# Patient Record
Sex: FEMALE | Race: ASIAN | NOT HISPANIC OR LATINO | URBAN - METROPOLITAN AREA
[De-identification: names, ages, dates, MRNs, and addresses within clinical notes are randomized per-mention and may not be internally consistent; named-entity substitution may affect disease eponyms.]

---

## 2017-01-01 ENCOUNTER — APPOINTMENT (OUTPATIENT)
Dept: GENERAL RADIOLOGY | Facility: CLINIC | Age: 27
DRG: 871 | End: 2017-01-01
Attending: DERMATOLOGY
Payer: MEDICAID

## 2017-01-01 ENCOUNTER — TRANSFERRED RECORDS (OUTPATIENT)
Dept: HEALTH INFORMATION MANAGEMENT | Facility: CLINIC | Age: 27
End: 2017-01-01

## 2017-01-01 ENCOUNTER — HOSPITAL ENCOUNTER (INPATIENT)
Facility: CLINIC | Age: 27
LOS: 2 days | DRG: 871 | End: 2017-06-30
Attending: INTERNAL MEDICINE | Admitting: SURGERY
Payer: MEDICAID

## 2017-01-01 ENCOUNTER — COMMUNICATION - HEALTHEAST (OUTPATIENT)
Dept: FAMILY MEDICINE | Facility: CLINIC | Age: 27
End: 2017-01-01

## 2017-01-01 ENCOUNTER — APPOINTMENT (OUTPATIENT)
Dept: GENERAL RADIOLOGY | Facility: CLINIC | Age: 27
DRG: 871 | End: 2017-01-01
Attending: PEDIATRICS
Payer: MEDICAID

## 2017-01-01 VITALS
DIASTOLIC BLOOD PRESSURE: 61 MMHG | WEIGHT: 91.27 LBS | SYSTOLIC BLOOD PRESSURE: 103 MMHG | OXYGEN SATURATION: 78 % | RESPIRATION RATE: 8 BRPM | TEMPERATURE: 97.7 F

## 2017-01-01 LAB
ABO + RH BLD: NORMAL
ABO + RH BLD: NORMAL
ALBUMIN UR-MCNC: >600 MG/DL
ALBUMIN UR-MCNC: ABNORMAL MG/DL
ANION GAP SERPL CALCULATED.3IONS-SCNC: 7 MMOL/L (ref 3–14)
ANION GAP SERPL CALCULATED.3IONS-SCNC: 8 MMOL/L (ref 3–14)
ANION GAP SERPL CALCULATED.3IONS-SCNC: 9 MMOL/L (ref 3–14)
APPEARANCE UR: ABNORMAL
APPEARANCE UR: ABNORMAL
BASOPHILS # BLD AUTO: 0.1 10E9/L (ref 0–0.2)
BASOPHILS NFR BLD AUTO: 0.1 %
BILIRUB UR QL STRIP: ABNORMAL
BILIRUB UR QL STRIP: NEGATIVE
BLD GP AB SCN SERPL QL: NORMAL
BLOOD BANK CMNT PATIENT-IMP: NORMAL
BUN SERPL-MCNC: 13 MG/DL (ref 7–30)
BUN SERPL-MCNC: 7 MG/DL (ref 7–30)
BUN SERPL-MCNC: 8 MG/DL (ref 7–30)
CALCIUM SERPL-MCNC: 7.3 MG/DL (ref 8.5–10.1)
CALCIUM SERPL-MCNC: 7.5 MG/DL (ref 8.5–10.1)
CALCIUM SERPL-MCNC: 7.7 MG/DL (ref 8.5–10.1)
CHLORIDE SERPL-SCNC: 108 MMOL/L (ref 94–109)
CHLORIDE SERPL-SCNC: 110 MMOL/L (ref 94–109)
CHLORIDE SERPL-SCNC: 111 MMOL/L (ref 94–109)
CK SERPL-CCNC: 98 U/L (ref 30–225)
CO2 SERPL-SCNC: 19 MMOL/L (ref 20–32)
CO2 SERPL-SCNC: 21 MMOL/L (ref 20–32)
CO2 SERPL-SCNC: 22 MMOL/L (ref 20–32)
COLOR UR AUTO: ABNORMAL
COLOR UR AUTO: ABNORMAL
CREAT SERPL-MCNC: 0.47 MG/DL (ref 0.52–1.04)
CREAT SERPL-MCNC: 0.78 MG/DL (ref 0.52–1.04)
CREAT SERPL-MCNC: 1.52 MG/DL (ref 0.52–1.04)
DIFFERENTIAL METHOD BLD: ABNORMAL
EOSINOPHIL # BLD AUTO: 0 10E9/L (ref 0–0.7)
EOSINOPHIL NFR BLD AUTO: 0.1 %
ERYTHROCYTE [DISTWIDTH] IN BLOOD BY AUTOMATED COUNT: 18.3 % (ref 10–15)
ERYTHROCYTE [DISTWIDTH] IN BLOOD BY AUTOMATED COUNT: 18.4 % (ref 10–15)
ERYTHROCYTE [DISTWIDTH] IN BLOOD BY AUTOMATED COUNT: NORMAL % (ref 10–15)
GFR SERPL CREATININE-BSD FRML MDRD: 41 ML/MIN/1.7M2
GFR SERPL CREATININE-BSD FRML MDRD: ABNORMAL ML/MIN/1.7M2
GFR SERPL CREATININE-BSD FRML MDRD: ABNORMAL ML/MIN/1.7M2
GLUCOSE BLDC GLUCOMTR-MCNC: 118 MG/DL (ref 70–99)
GLUCOSE BLDC GLUCOMTR-MCNC: 80 MG/DL (ref 70–99)
GLUCOSE SERPL-MCNC: 101 MG/DL (ref 70–99)
GLUCOSE SERPL-MCNC: 107 MG/DL (ref 70–99)
GLUCOSE SERPL-MCNC: 90 MG/DL (ref 70–99)
GLUCOSE UR STRIP-MCNC: 30 MG/DL
GLUCOSE UR STRIP-MCNC: ABNORMAL MG/DL
HCT VFR BLD AUTO: 24.6 % (ref 35–47)
HCT VFR BLD AUTO: 28.1 % (ref 35–47)
HCT VFR BLD AUTO: NORMAL % (ref 35–47)
HGB BLD-MCNC: 6.9 G/DL (ref 11.7–15.7)
HGB BLD-MCNC: 7.7 G/DL (ref 11.7–15.7)
HGB BLD-MCNC: 7.9 G/DL (ref 11.7–15.7)
HGB BLD-MCNC: NORMAL G/DL (ref 11.7–15.7)
HGB UR QL STRIP: ABNORMAL
HGB UR QL STRIP: ABNORMAL
IMM GRANULOCYTES # BLD: 0.5 10E9/L (ref 0–0.4)
IMM GRANULOCYTES NFR BLD: 0.9 %
KETONES UR STRIP-MCNC: 5 MG/DL
KETONES UR STRIP-MCNC: ABNORMAL MG/DL
LACTATE BLD-SCNC: 1.3 MMOL/L (ref 0.7–2.1)
LACTATE BLD-SCNC: 2.1 MMOL/L (ref 0.7–2.1)
LEUKOCYTE ESTERASE UR QL STRIP: ABNORMAL
LEUKOCYTE ESTERASE UR QL STRIP: ABNORMAL
LYMPHOCYTES # BLD AUTO: 2.8 10E9/L (ref 0.8–5.3)
LYMPHOCYTES NFR BLD AUTO: 5.7 %
MAGNESIUM SERPL-MCNC: 2.6 MG/DL (ref 1.6–2.3)
MAGNESIUM SERPL-MCNC: 2.8 MG/DL (ref 1.6–2.3)
MCH RBC QN AUTO: 21.5 PG (ref 26.5–33)
MCH RBC QN AUTO: 21.8 PG (ref 26.5–33)
MCH RBC QN AUTO: NORMAL PG (ref 26.5–33)
MCHC RBC AUTO-ENTMCNC: 28 G/DL (ref 31.5–36.5)
MCHC RBC AUTO-ENTMCNC: 28.1 G/DL (ref 31.5–36.5)
MCHC RBC AUTO-ENTMCNC: NORMAL G/DL (ref 31.5–36.5)
MCV RBC AUTO: 77 FL (ref 78–100)
MCV RBC AUTO: 78 FL (ref 78–100)
MCV RBC AUTO: NORMAL FL (ref 78–100)
MONOCYTES # BLD AUTO: 1.7 10E9/L (ref 0–1.3)
MONOCYTES NFR BLD AUTO: 3.5 %
NEUTROPHILS # BLD AUTO: 43.6 10E9/L (ref 1.6–8.3)
NEUTROPHILS NFR BLD AUTO: 89.7 %
NITRATE UR QL: ABNORMAL
NITRATE UR QL: NEGATIVE
NRBC # BLD AUTO: 0 10*3/UL
NRBC BLD AUTO-RTO: 0 /100
PH UR STRIP: 6 PH (ref 5–7)
PH UR STRIP: ABNORMAL PH (ref 5–7)
PHOSPHATE SERPL-MCNC: 3.8 MG/DL (ref 2.5–4.5)
PLATELET # BLD AUTO: 213 10E9/L (ref 150–450)
PLATELET # BLD AUTO: 500 10E9/L (ref 150–450)
PLATELET # BLD AUTO: NORMAL 10E9/L (ref 150–450)
POTASSIUM SERPL-SCNC: 3.2 MMOL/L (ref 3.4–5.3)
POTASSIUM SERPL-SCNC: 3.8 MMOL/L (ref 3.4–5.3)
POTASSIUM SERPL-SCNC: 3.8 MMOL/L (ref 3.4–5.3)
PROCALCITONIN SERPL-MCNC: 38.92 NG/ML
RBC # BLD AUTO: 3.21 10E12/L (ref 3.8–5.2)
RBC # BLD AUTO: 3.62 10E12/L (ref 3.8–5.2)
RBC # BLD AUTO: NORMAL 10E12/L (ref 3.8–5.2)
RBC #/AREA URNS AUTO: 27 /HPF (ref 0–2)
RBC #/AREA URNS AUTO: ABNORMAL /HPF (ref 0–2)
SODIUM SERPL-SCNC: 136 MMOL/L (ref 133–144)
SODIUM SERPL-SCNC: 138 MMOL/L (ref 133–144)
SODIUM SERPL-SCNC: 141 MMOL/L (ref 133–144)
SP GR UR STRIP: 1.03 (ref 1–1.03)
SP GR UR STRIP: ABNORMAL (ref 1–1.03)
SPECIMEN EXP DATE BLD: NORMAL
URN SPEC COLLECT METH UR: ABNORMAL
URN SPEC COLLECT METH UR: ABNORMAL
UROBILINOGEN UR STRIP-MCNC: ABNORMAL MG/DL (ref 0–2)
UROBILINOGEN UR STRIP-MCNC: NORMAL MG/DL (ref 0–2)
WBC # BLD AUTO: 27.8 10E9/L (ref 4–11)
WBC # BLD AUTO: 45.7 10E9/L (ref 4–11)
WBC # BLD AUTO: 48.6 10E9/L (ref 4–11)
WBC # BLD AUTO: NORMAL 10E9/L (ref 4–11)
WBC #/AREA URNS AUTO: 737 /HPF (ref 0–2)
WBC #/AREA URNS AUTO: ABNORMAL /HPF (ref 0–2)
WBC CLUMPS #/AREA URNS HPF: PRESENT /HPF

## 2017-01-01 PROCEDURE — 83735 ASSAY OF MAGNESIUM: CPT | Performed by: STUDENT IN AN ORGANIZED HEALTH CARE EDUCATION/TRAINING PROGRAM

## 2017-01-01 PROCEDURE — 25000132 ZZH RX MED GY IP 250 OP 250 PS 637: Performed by: DERMATOLOGY

## 2017-01-01 PROCEDURE — 93005 ELECTROCARDIOGRAM TRACING: CPT

## 2017-01-01 PROCEDURE — 80048 BASIC METABOLIC PNL TOTAL CA: CPT | Performed by: PEDIATRICS

## 2017-01-01 PROCEDURE — 40000275 ZZH STATISTIC RCP TIME EA 10 MIN

## 2017-01-01 PROCEDURE — 71010 XR CHEST PORT 1 VW: CPT

## 2017-01-01 PROCEDURE — 80048 BASIC METABOLIC PNL TOTAL CA: CPT | Performed by: DERMATOLOGY

## 2017-01-01 PROCEDURE — 85027 COMPLETE CBC AUTOMATED: CPT | Performed by: DERMATOLOGY

## 2017-01-01 PROCEDURE — 36415 COLL VENOUS BLD VENIPUNCTURE: CPT | Performed by: DERMATOLOGY

## 2017-01-01 PROCEDURE — 36415 COLL VENOUS BLD VENIPUNCTURE: CPT | Performed by: NEUROLOGICAL SURGERY

## 2017-01-01 PROCEDURE — 25000128 H RX IP 250 OP 636: Performed by: DERMATOLOGY

## 2017-01-01 PROCEDURE — 40000983 ZZH STATISTIC HFNC ADULT NON-CPAP

## 2017-01-01 PROCEDURE — 84145 PROCALCITONIN (PCT): CPT | Performed by: PEDIATRICS

## 2017-01-01 PROCEDURE — 25000128 H RX IP 250 OP 636: Performed by: STUDENT IN AN ORGANIZED HEALTH CARE EDUCATION/TRAINING PROGRAM

## 2017-01-01 PROCEDURE — 12000008 ZZH R&B INTERMEDIATE UMMC

## 2017-01-01 PROCEDURE — 99233 SBSQ HOSP IP/OBS HIGH 50: CPT | Mod: GC | Performed by: INTERNAL MEDICINE

## 2017-01-01 PROCEDURE — 86900 BLOOD TYPING SEROLOGIC ABO: CPT | Performed by: STUDENT IN AN ORGANIZED HEALTH CARE EDUCATION/TRAINING PROGRAM

## 2017-01-01 PROCEDURE — 83605 ASSAY OF LACTIC ACID: CPT | Performed by: NEUROLOGICAL SURGERY

## 2017-01-01 PROCEDURE — 85025 COMPLETE CBC W/AUTO DIFF WBC: CPT | Performed by: STUDENT IN AN ORGANIZED HEALTH CARE EDUCATION/TRAINING PROGRAM

## 2017-01-01 PROCEDURE — 20000004 ZZH R&B ICU UMMC

## 2017-01-01 PROCEDURE — 87040 BLOOD CULTURE FOR BACTERIA: CPT | Performed by: STUDENT IN AN ORGANIZED HEALTH CARE EDUCATION/TRAINING PROGRAM

## 2017-01-01 PROCEDURE — 85027 COMPLETE CBC AUTOMATED: CPT | Performed by: SURGERY

## 2017-01-01 PROCEDURE — 86850 RBC ANTIBODY SCREEN: CPT | Performed by: STUDENT IN AN ORGANIZED HEALTH CARE EDUCATION/TRAINING PROGRAM

## 2017-01-01 PROCEDURE — 25000128 H RX IP 250 OP 636: Performed by: INTERNAL MEDICINE

## 2017-01-01 PROCEDURE — 83735 ASSAY OF MAGNESIUM: CPT | Performed by: DERMATOLOGY

## 2017-01-01 PROCEDURE — 84100 ASSAY OF PHOSPHORUS: CPT | Performed by: DERMATOLOGY

## 2017-01-01 PROCEDURE — 80048 BASIC METABOLIC PNL TOTAL CA: CPT | Performed by: STUDENT IN AN ORGANIZED HEALTH CARE EDUCATION/TRAINING PROGRAM

## 2017-01-01 PROCEDURE — 93010 ELECTROCARDIOGRAM REPORT: CPT | Performed by: INTERNAL MEDICINE

## 2017-01-01 PROCEDURE — 25000128 H RX IP 250 OP 636

## 2017-01-01 PROCEDURE — 83605 ASSAY OF LACTIC ACID: CPT | Performed by: DERMATOLOGY

## 2017-01-01 PROCEDURE — 81001 URINALYSIS AUTO W/SCOPE: CPT | Performed by: INTERNAL MEDICINE

## 2017-01-01 PROCEDURE — 36416 COLLJ CAPILLARY BLOOD SPEC: CPT | Performed by: STUDENT IN AN ORGANIZED HEALTH CARE EDUCATION/TRAINING PROGRAM

## 2017-01-01 PROCEDURE — 85018 HEMOGLOBIN: CPT | Performed by: STUDENT IN AN ORGANIZED HEALTH CARE EDUCATION/TRAINING PROGRAM

## 2017-01-01 PROCEDURE — 36415 COLL VENOUS BLD VENIPUNCTURE: CPT | Performed by: SURGERY

## 2017-01-01 PROCEDURE — 25000128 H RX IP 250 OP 636: Performed by: PEDIATRICS

## 2017-01-01 PROCEDURE — 87086 URINE CULTURE/COLONY COUNT: CPT | Performed by: INTERNAL MEDICINE

## 2017-01-01 PROCEDURE — 86901 BLOOD TYPING SEROLOGIC RH(D): CPT | Performed by: STUDENT IN AN ORGANIZED HEALTH CARE EDUCATION/TRAINING PROGRAM

## 2017-01-01 PROCEDURE — 99222 1ST HOSP IP/OBS MODERATE 55: CPT | Mod: GC | Performed by: SURGERY

## 2017-01-01 PROCEDURE — 00000146 ZZHCL STATISTIC GLUCOSE BY METER IP

## 2017-01-01 PROCEDURE — 27210300 ZZH CANNULA HIGH FLOW, ADULT

## 2017-01-01 PROCEDURE — 36415 COLL VENOUS BLD VENIPUNCTURE: CPT | Performed by: STUDENT IN AN ORGANIZED HEALTH CARE EDUCATION/TRAINING PROGRAM

## 2017-01-01 PROCEDURE — 82550 ASSAY OF CK (CPK): CPT | Performed by: DERMATOLOGY

## 2017-01-01 PROCEDURE — 85048 AUTOMATED LEUKOCYTE COUNT: CPT | Performed by: PEDIATRICS

## 2017-01-01 PROCEDURE — 25000125 ZZHC RX 250: Performed by: STUDENT IN AN ORGANIZED HEALTH CARE EDUCATION/TRAINING PROGRAM

## 2017-01-01 RX ORDER — PIPERACILLIN SODIUM, TAZOBACTAM SODIUM 3; .375 G/15ML; G/15ML
3.38 INJECTION, POWDER, LYOPHILIZED, FOR SOLUTION INTRAVENOUS EVERY 6 HOURS
Status: DISCONTINUED | OUTPATIENT
Start: 2017-01-01 | End: 2017-01-01

## 2017-01-01 RX ORDER — SODIUM CHLORIDE 9 MG/ML
INJECTION, SOLUTION INTRAVENOUS CONTINUOUS
Status: ACTIVE | OUTPATIENT
Start: 2017-01-01 | End: 2017-01-01

## 2017-01-01 RX ORDER — DOPAMINE HYDROCHLORIDE 160 MG/100ML
2-20 INJECTION, SOLUTION INTRAVENOUS CONTINUOUS
Status: DISCONTINUED | OUTPATIENT
Start: 2017-01-01 | End: 2017-01-01 | Stop reason: HOSPADM

## 2017-01-01 RX ORDER — DOPAMINE HYDROCHLORIDE 160 MG/100ML
INJECTION, SOLUTION INTRAVENOUS
Status: COMPLETED
Start: 2017-01-01 | End: 2017-01-01

## 2017-01-01 RX ORDER — VANCOMYCIN HYDROCHLORIDE 1 G/200ML
1000 INJECTION, SOLUTION INTRAVENOUS ONCE
Status: COMPLETED | OUTPATIENT
Start: 2017-01-01 | End: 2017-01-01

## 2017-01-01 RX ORDER — ONDANSETRON 2 MG/ML
4 INJECTION INTRAMUSCULAR; INTRAVENOUS EVERY 6 HOURS PRN
Status: DISCONTINUED | OUTPATIENT
Start: 2017-01-01 | End: 2017-01-01 | Stop reason: HOSPADM

## 2017-01-01 RX ORDER — HEPARIN SODIUM,PORCINE 10 UNIT/ML
2-5 VIAL (ML) INTRAVENOUS
Status: DISCONTINUED | OUTPATIENT
Start: 2017-01-01 | End: 2017-01-01 | Stop reason: HOSPADM

## 2017-01-01 RX ORDER — LIDOCAINE 40 MG/G
CREAM TOPICAL
Status: DISCONTINUED | OUTPATIENT
Start: 2017-01-01 | End: 2017-01-01 | Stop reason: HOSPADM

## 2017-01-01 RX ORDER — POTASSIUM CHLORIDE 20MEQ/15ML
40 LIQUID (ML) ORAL DAILY
Status: DISCONTINUED | OUTPATIENT
Start: 2017-01-01 | End: 2017-01-01

## 2017-01-01 RX ORDER — SODIUM CHLORIDE 9 MG/ML
INJECTION, SOLUTION INTRAVENOUS
Status: COMPLETED
Start: 2017-01-01 | End: 2017-01-01

## 2017-01-01 RX ORDER — FERROUS SULFATE 7.5 MG/0.5
100 SYRINGE (EA) ORAL DAILY
Status: DISCONTINUED | OUTPATIENT
Start: 2017-01-01 | End: 2017-01-01

## 2017-01-01 RX ORDER — ONDANSETRON 4 MG/1
4 TABLET, ORALLY DISINTEGRATING ORAL EVERY 6 HOURS PRN
Status: DISCONTINUED | OUTPATIENT
Start: 2017-01-01 | End: 2017-01-01 | Stop reason: HOSPADM

## 2017-01-01 RX ORDER — POTASSIUM CHLORIDE 20MEQ/15ML
40 LIQUID (ML) ORAL ONCE
Status: COMPLETED | OUTPATIENT
Start: 2017-01-01 | End: 2017-01-01

## 2017-01-01 RX ORDER — PIPERACILLIN SODIUM, TAZOBACTAM SODIUM 2; .25 G/10ML; G/10ML
2.25 INJECTION, POWDER, LYOPHILIZED, FOR SOLUTION INTRAVENOUS EVERY 6 HOURS
Status: DISCONTINUED | OUTPATIENT
Start: 2017-01-01 | End: 2017-01-01 | Stop reason: HOSPADM

## 2017-01-01 RX ORDER — NALOXONE HYDROCHLORIDE 0.4 MG/ML
.1-.4 INJECTION, SOLUTION INTRAMUSCULAR; INTRAVENOUS; SUBCUTANEOUS
Status: DISCONTINUED | OUTPATIENT
Start: 2017-01-01 | End: 2017-01-01 | Stop reason: HOSPADM

## 2017-01-01 RX ORDER — SODIUM CHLORIDE, SODIUM LACTATE, POTASSIUM CHLORIDE, CALCIUM CHLORIDE 600; 310; 30; 20 MG/100ML; MG/100ML; MG/100ML; MG/100ML
INJECTION, SOLUTION INTRAVENOUS
Status: COMPLETED
Start: 2017-01-01 | End: 2017-01-01

## 2017-01-01 RX ORDER — ENOXAPARIN SODIUM 100 MG/ML
20 INJECTION SUBCUTANEOUS EVERY 24 HOURS
Status: DISCONTINUED | OUTPATIENT
Start: 2017-01-01 | End: 2017-01-01 | Stop reason: HOSPADM

## 2017-01-01 RX ORDER — SODIUM CHLORIDE 9 MG/ML
INJECTION, SOLUTION INTRAVENOUS CONTINUOUS
Status: DISCONTINUED | OUTPATIENT
Start: 2017-01-01 | End: 2017-01-01

## 2017-01-01 RX ORDER — AMOXICILLIN 250 MG
1-2 CAPSULE ORAL 2 TIMES DAILY PRN
Status: DISCONTINUED | OUTPATIENT
Start: 2017-01-01 | End: 2017-01-01 | Stop reason: HOSPADM

## 2017-01-01 RX ORDER — SODIUM CHLORIDE, SODIUM LACTATE, POTASSIUM CHLORIDE, CALCIUM CHLORIDE 600; 310; 30; 20 MG/100ML; MG/100ML; MG/100ML; MG/100ML
INJECTION, SOLUTION INTRAVENOUS
Status: DISCONTINUED
Start: 2017-01-01 | End: 2017-01-01 | Stop reason: HOSPADM

## 2017-01-01 RX ADMIN — NOREPINEPHRINE BITARTRATE 0.03 MCG/KG/MIN: 1 INJECTION INTRAVENOUS at 08:33

## 2017-01-01 RX ADMIN — MINERAL SUPPLEMENT IRON 300 MG / 5 ML STRENGTH LIQUID 100 PER BOX UNFLAVORED 300 MG: at 07:52

## 2017-01-01 RX ADMIN — VANCOMYCIN HYDROCHLORIDE 1000 MG: 1 INJECTION, SOLUTION INTRAVENOUS at 09:37

## 2017-01-01 RX ADMIN — PIPERACILLIN AND TAZOBACTAM 2.25 G: 2; .25 INJECTION, POWDER, LYOPHILIZED, FOR SOLUTION INTRAVENOUS; PARENTERAL at 13:37

## 2017-01-01 RX ADMIN — PIPERACILLIN AND TAZOBACTAM 2.25 G: 2; .25 INJECTION, POWDER, LYOPHILIZED, FOR SOLUTION INTRAVENOUS; PARENTERAL at 02:45

## 2017-01-01 RX ADMIN — MINERAL SUPPLEMENT IRON 300 MG / 5 ML STRENGTH LIQUID 100 PER BOX UNFLAVORED 300 MG: at 21:14

## 2017-01-01 RX ADMIN — SODIUM CHLORIDE: 9 INJECTION, SOLUTION INTRAVENOUS at 06:54

## 2017-01-01 RX ADMIN — SODIUM CHLORIDE: 9 INJECTION, SOLUTION INTRAVENOUS at 08:06

## 2017-01-01 RX ADMIN — SODIUM CHLORIDE 1000 ML: 9 INJECTION, SOLUTION INTRAVENOUS at 03:22

## 2017-01-01 RX ADMIN — PIPERACILLIN AND TAZOBACTAM 2.25 G: 2; .25 INJECTION, POWDER, LYOPHILIZED, FOR SOLUTION INTRAVENOUS; PARENTERAL at 08:09

## 2017-01-01 RX ADMIN — SODIUM CHLORIDE, POTASSIUM CHLORIDE, SODIUM LACTATE AND CALCIUM CHLORIDE 500 ML: 600; 310; 30; 20 INJECTION, SOLUTION INTRAVENOUS at 07:50

## 2017-01-01 RX ADMIN — PIPERACILLIN AND TAZOBACTAM 3.38 G: 3; .375 INJECTION, POWDER, LYOPHILIZED, FOR SOLUTION INTRAVENOUS; PARENTERAL at 07:48

## 2017-01-01 RX ADMIN — DOPAMINE HYDROCHLORIDE 5 MCG/KG/MIN: 160 INJECTION, SOLUTION INTRAVENOUS at 09:33

## 2017-01-01 RX ADMIN — PIPERACILLIN AND TAZOBACTAM 3.38 G: 3; .375 INJECTION, POWDER, LYOPHILIZED, FOR SOLUTION INTRAVENOUS; PARENTERAL at 01:55

## 2017-01-01 RX ADMIN — ENOXAPARIN SODIUM 20 MG: 300 INJECTION INTRAVENOUS; SUBCUTANEOUS at 02:42

## 2017-01-01 RX ADMIN — PIPERACILLIN AND TAZOBACTAM 3.38 G: 3; .375 INJECTION, POWDER, LYOPHILIZED, FOR SOLUTION INTRAVENOUS; PARENTERAL at 20:06

## 2017-01-01 RX ADMIN — ENOXAPARIN SODIUM 30 MG: 30 INJECTION SUBCUTANEOUS at 21:36

## 2017-01-01 RX ADMIN — PIPERACILLIN AND TAZOBACTAM 2.25 G: 2; .25 INJECTION, POWDER, LYOPHILIZED, FOR SOLUTION INTRAVENOUS; PARENTERAL at 20:26

## 2017-01-01 RX ADMIN — SODIUM CHLORIDE: 900 INJECTION, SOLUTION INTRAVENOUS at 08:13

## 2017-01-01 RX ADMIN — POTASSIUM CHLORIDE 40 MEQ: 40 SOLUTION ORAL at 20:30

## 2017-01-01 RX ADMIN — SODIUM CHLORIDE, POTASSIUM CHLORIDE, SODIUM LACTATE AND CALCIUM CHLORIDE 500 ML: 600; 310; 30; 20 INJECTION, SOLUTION INTRAVENOUS at 08:09

## 2017-01-01 ASSESSMENT — PAIN DESCRIPTION - DESCRIPTORS: DESCRIPTORS: HEADACHE

## 2017-06-28 PROBLEM — I95.9 HYPOTENSION: Status: ACTIVE | Noted: 2017-01-01

## 2017-06-28 NOTE — CONSULTS
[facility]    NEUROSURGERY CONSULTATION NOTE    This consultation was requested by Dr. Mcadams from the MICU service.    Reason for Consultation: Patient with a shunt in the setting of urosepsis.     HPI: Shelton Acosta is a 26 year old developmentally delayed, Hmong-speaking, Spina bifida patient with a right occipital  shunt who presents with a positive UTI.     PAST MEDICAL HISTORY:   Spina bifida  Developmental delay   shunt    PAST SURGICAL HISTORY:    shunt    FAMILY HISTORY: Noncontributory    SOCIAL HISTORY:   Social History   Substance Use Topics     Smoking status: Not on file     Smokeless tobacco: Not on file     Alcohol use Not on file       MEDICATIONS:  No current outpatient prescriptions on file.       Allergies:  No Known Allergies      ROS: 10 point ROS of systems including Constitutional, Eyes, Respiratory, Cardiovascular, Gastroenterology, Genitourinary, Integumentary, Muscularskeletal, Psychiatric were all negative except for pertinent positives noted in my HPI.      PE:  Blood pressure 107/65, temperature 98.3  F (36.8  C), temperature source Oral, resp. rate 26, weight 38.9 kg (85 lb 12.1 oz), SpO2 100 %.  CV: RRR, no murmurs, rubs, or gallops  PULM: CTA B, no wheezes, no Battles, no crepitance, no racoons?  ABD: soft, non-distended, BS+  NEUROLOGIC:  -- Awake; Alert; oriented x 1  -- Follows commands briskly  -- Pupils reactive.       ASSESSMENT: Ms. Acosta is a 26 year old female with spina bifida and a  shunt who presents with urosepsis.     RECOMMENDATIONS:  - No neurosurgical intervention indicated at this time   - No shunt tap necessary. If acutely worsens would consider.   - Unlikely to be meningitic.   - Please obtain previous CT scans from OSH as will need comparisons.     The patient was discussed with the neurosurgery chief resident, who agrees with the above outlined plan.    Contact the neurosurgery resident on call with questions.     Dial * * *[phone]: Enter [phone] when prompted.   Joseph Jamison MD, PhD  Neurosurgery PGY-2

## 2017-06-28 NOTE — PROGRESS NOTES
Pt with spina bifida, developmental delay,  shunt, with c/o Headache. Transferred to MICU from [facility] ED with urosepsis requiring levophed.     A: Pt arrived via [facility] tport at 1640 on 3L NC, , BP: 110/60, RR 18, O2: 100%. Laying in prone position (as per her norm due to large spina bifida bony prominence on back). NS at 75/hr, received 2L at OSH as well as vanco, zosyn, and unasyn. Mag (1.5) replaced. K (3.0) not yet replaced. Incontinent of urine and stool but with intact skin.     Family: Mom, Wilver (Hmong speaking only) and sister, Keisha (bilingual) at bedside with [email or web address] . Both helped with admission questions and are informed about pt's condition. Per sister, straight faviola's TID, likes to color and play on ipad. Dietary preferences: no dairy. No greasy foods, and low salt.  Wilver plans to return tomorrow evening.     P: F/U labs and continue to monitor BP closely.    Neurosurg to see to r/o meningitis.   Possible transfer to Medicine floor tomorrow if BPs remain stable.

## 2017-06-28 NOTE — H&P
[facility]    History and Physical  [facility] Intensive Care     Date of Admission:  6/28/2017  [facility] History and Physical    Shelton Acosta MRN# [id number]   Age: 26 year old YOB: 1990     Date of Admission:  6/28/2017      Primary care provider: No primary care provider on file.          Assessment and Plan:   Assessment:   This patient is a 26 year old  female with a significant past medical history of spina bifida s/p  shunt, who presents from OSH with a CC of HA found to have hypotension requiring norepi support. She was transferred here for further management of hypotension and eval of  shunt by neurosurgery. Now off pressors.       Plan:   Neuro   #Spina bifida: Per mom, patient is at baseline neurologically. She is interactive but per mom has trouble with communication; she is effectively quadriplegic and has no sensation or movement below her waist    #Pain: acetaminophen 650 Q4H PRN     CVD:   #Hypotension: in the setting of urosepsis, meningitis less likely   - Improved with fluids and pressors  - Pt now with MAPs in the 70s and off pressors     Pulm: No acute issues; patient satting well on RA     ID:  # Septic shock: source likely UTI, vs lower concern for meningitis. UA at OSH and elevated WBC concerning for UTI. OSH concerned for meningitis given  shunt and hx of HA, and she was empirically treated with vanc and ampicillin.   - Repeated UA/cx  - will f/u blood cxs from OSH  - Zosyn 3.325mg Q6H  - Neurosurgery consulted for eval of  shunt and we will follow their recommendations regarding empiric treatment for meningitis    #lactic acidosis: 3.7 at OSH; 1.3 here after fluids     Vanc 6/28  Amp 6/28  Zosyn 6/28- present    Renal: no acute issues; patient is straight cathed at home    Heme  - Microcytic anemia: Hgb at OSH was 8.1; no signs of active bleeding; patient takes Fe at home       LINES: Peripheral line    Prophylaxis:  DVT: Padua prediction score of 4 2/2 immobility and active infection: Lovenox 30mg Q24hr  GI: none   Family:  Updated today with Cambodian    Code Status: DNR/DNI                 Chief Complaint:   Headache     Indication for critical care admission:  Hypotension     History is obtained from the patient's mom.           History of Present Illness (Resident / Clinician):   This patient is a 26 year old  female with a significant past medical history of spina bifida s/p  shunt,who presents from OSH with hypotension requiring pressor support. For one week, the patient has experienced HA, diarrhea, drowsiness, and decreased appetite.She denied any other recent illnesses or hospitalizations.  Her mother brought her to [facility] ED where her BP was 80/58, temp was 99.3, , RR 16, and SpO2 94%. She received a 1L fluid bolus and small doses of Levophed for a goal MAP greater than 65. WBC was 42.6, Hgb 8.1, plts 750, and UA showed greater than 100 WBC, elevated leuk esterase, few bacteria, negative nitrites. UA showed blood but 0-2 RBCs. Lactate was 3.7 and procalcitonin was 30.6. Blood cxs were drawn and she was given Zosyn for UTI treatment.  Non-con CT head was not concerning for  shunt dysfunction or hemorrhage and patient was treated empirically with vanc and amp for meningitis per neurology. She was transferred to our ICU for further management of hypotension and  shunt.              Past Medical History:   Spina bifida s/p  shunt          Past Surgical History:             Social History:   Lives with mother and sister. Quadriplegic and incontinent at baseline.           Family History:          Immunizations:          Allergies:   NKMA  Lactose intolerant          Medications:   Oral Fe  Tylenol PRN for pain           Review of Systems:   The Review of Systems is negative other than noted in the HPI          Physical Exam:   Temp: 98.3  F (36.8  C) Temp  Min: 98.3  F (36.8  C)   Max: 98.3  F (36.8  C)  Resp: 26 Resp  Min: 16  Max: 28  SpO2: 100 % SpO2  Min: 89 %  Max: 100 %    No Data Recorded  Heart Rate: 129 Heart Rate  Min: 110  Max: 130  BP: 107/65 Systolic (24hrs), Av , Min:84 , Max:107   Diastolic (24hrs), Av, Min:46, Max:71    Gen: Pt interviewed with Cambodian  in the room; pt lying on stomach, NAD  Pulm: LCTA posteriorly, no crackles or wheezes   Cardiac: hard to appreciate heart sounds as patient is unable to lay on her back; tachycardic  Abd: soft, non tender   Neuro: tracks with eyes, alert   Extremities: atrophy; warm; non-edematous   Back: marked kyphosis with surgical scars, scoliosis   Skin: scars on knuckles and arms           Data:   All labs and information from [facility] reviewed. Please refer to H&P for pertinent labs.     Patient care seen and discussed with Dr. Rojas.     Jamee Badillo M.D.   PGY1 Internal Medicine   [phone]

## 2017-06-29 NOTE — PLAN OF CARE
Problem: Goal Outcome Summary  Goal: Goal Outcome Summary  Outcome: No Change  D: Pt admitted with urosepsis and c/o HA.   A/I: Patient remains on 1L NC supplemental O2 and sating high 90's. Tried to turn off O2 and patient's sats drops to the mid 80's. Will continue to wean as tolerated. Afebrile with BP MAP>65

## 2017-06-29 NOTE — PROGRESS NOTES
[facility] ICU Progress Note          Assessment and Plan:   Assessment:   This patient is a 26 year old  female with a significant past medical history of spina bifida s/p  shunt, who presents from OSH with a CC of HA found to have hypotension requiring norepi support. She was transferred here for further management of hypotension and eval of  shunt by neurosurgery. Now off pressors.       Plan:   Neuro   #Spina bifida: Per mom, patient is at baseline neurologically. She is interactive but per mom has trouble with communication; she is effectively quadriplegic and has no sensation or movement below her waist    #Pain: acetaminophen 650 Q4H PRN     CVD:   #Hypotension: in the setting of urosepsis, meningitis less likely   - Improved with fluids and pressors  - Pt now with MAPs in the 70s and off pressors     Pulm: Patient is not on oxygen at home. She is currently on NC O2 at 1L and desatted to lower 90's when we removed NC. Low suspicion for PNA (no cough, fever), most likely 2/2 body habitus.  - CXR    ID:  # Septic shock: source likely UTI, vs lower concern for meningitis. UA at OSH and elevated WBC concerning for UTI. OSH concerned for meningitis given  shunt and hx of HA, and she was empirically treated with vanc and ampicillin.   - Repeated UA/cx  - will f/u blood cxs from OSH  - Zosyn 3.325mg Q6H  - Neurosurgery consulted for eval of - thought empiric treatment for meningitis not necessary     #lactic acidosis: 3.7 at OSH; 1.3 here after fluids     Vanc 6/28  Amp 6/28  Zosyn 6/28- present    Renal: no acute issues; patient is straight cathed at home    Heme  - Microcytic anemia: Hgb at OSH was 8.1; no signs of active bleeding; patient takes Fe at home       LINES: Peripheral line   Prophylaxis:  DVT: Padua prediction score of 4 2/2 immobility and active infection: Lovenox 30mg Q24hr  GI: none   Family:  Updated today with Cambodian    Code Status: DNR/DNI               Interval  History:   No acute events overnight         Significant Problems:     Patient Active Problem List   Diagnosis     Hypotension      Gen: Pt interviewed with Cambodian  in the room; pt lying on stomach, NAD  Pulm: LCTA posteriorly, no crackles or wheezes   Cardiac: hard to appreciate heart sounds as patient is unable to lay on her back; tachycardic  Abd: soft, non tender   Neuro: tracks with eyes, alert   Extremities: atrophy; warm; non-edematous   Back: marked kyphosis with surgical scars, scoliosis   Skin: scars on knuckles and arms        Review of Systems:   The Review of Systems is negative other than noted in the HPI          Medications:     Current Facility-Administered Medications   Medication     piperacillin-tazobactam (ZOSYN) 2.25 g vial to attach to  ml bag     enoxaparin (LOVENOX) PEDS/NICU injection 20 mg     naloxone (NARCAN) injection 0.1-0.4 mg     senna-docusate (SENOKOT-S;PERICOLACE) 8.6-50 MG per tablet 1-2 tablet     ondansetron (ZOFRAN-ODT) ODT tab 4 mg    Or     ondansetron (ZOFRAN) injection 4 mg     acetaminophen (TYLENOL) solution 650 mg     ferrous sulfate 300 (60 FE) MG/5ML syrup 300 mg            Data:     Results for orders placed or performed during the hospital encounter of 06/28/17 (from the past 24 hour(s))   Neurosurgery IP Consult: Patient to be seen: Routine - within 24 hours; Call back #: [id number]; 25 yo with spina bifida, who is presenting with septic shock. Likely urosepsis. Has HA, but no other symptoms to suggest meningitis. Continue treatment for...    Narrative    Josehp Jamison MD     6/28/2017  7:55 PM  [facility]    NEUROSURGERY CONSULTATION NOTE    This consultation was requested by Dr. Mcadams from the MICU   service.    Reason for Consultation: Patient with a shunt in the setting of   urosepsis.     HPI: Shelton Acosta is a 26 year old developmentally delayed,   Hmong-speaking, Spina bifida patient with a  right occipital    shunt who presents with a positive UTI.     PAST MEDICAL HISTORY:   Spina bifida  Developmental delay   shunt    PAST SURGICAL HISTORY:    shunt    FAMILY HISTORY: Noncontributory    SOCIAL HISTORY:   Social History   Substance Use Topics     Smoking status: Not on file     Smokeless tobacco: Not on file     Alcohol use Not on file       MEDICATIONS:  No current outpatient prescriptions on file.       Allergies:  No Known Allergies      ROS: 10 point ROS of systems including Constitutional, Eyes,   Respiratory, Cardiovascular, Gastroenterology, Genitourinary,   Integumentary, Muscularskeletal, Psychiatric were all negative   except for pertinent positives noted in my HPI.      PE:  Blood pressure 107/65, temperature 98.3  F (36.8  C), temperature   source Oral, resp. rate 26, weight 38.9 kg (85 lb 12.1 oz), SpO2   100 %.  CV: RRR, no murmurs, rubs, or gallops  PULM: CTA B, no wheezes, no Battles, no crepitance, no racoons?  ABD: soft, non-distended, BS+  NEUROLOGIC:  -- Awake; Alert; oriented x 1  -- Follows commands briskly  -- Pupils reactive.       ASSESSMENT: Ms. Acosta is a 26 year old female with spina bifida   and a  shunt who presents with urosepsis.     RECOMMENDATIONS:  - No neurosurgical intervention indicated at this time   - No shunt tap necessary. If acutely worsens would consider.   - Unlikely to be meningitic.   - Please obtain previous CT scans from OSH as will need   comparisons.     The patient was discussed with the neurosurgery chief resident,   who agrees with the above outlined plan.    Contact the neurosurgery resident on call with questions.    Dial * * *[phone]: Enter [phone] when prompted.   Joseph Jamison MD, PhD  Neurosurgery PGY-2         Basic metabolic panel   Result Value Ref Range    Sodium 136 133 - 144 mmol/L    Potassium 3.2 (L) 3.4 - 5.3 mmol/L    Chloride 108 94 - 109 mmol/L    Carbon Dioxide 19 (L) 20 - 32 mmol/L    Anion Gap 9 3 - 14 mmol/L    Glucose 90 70  - 99 mg/dL    Urea Nitrogen 7 7 - 30 mg/dL    Creatinine 0.47 (L) 0.52 - 1.04 mg/dL    GFR Estimate >90  Non  GFR Calc   >60 mL/min/1.7m2    GFR Estimate If Black >90   GFR Calc   >60 mL/min/1.7m2    Calcium 7.3 (L) 8.5 - 10.1 mg/dL   CBC with platelets   Result Value Ref Range    WBC  4.0 - 11.0 10e9/L     Quantity not sufficient  NOTIFIED STU NAZARIO RN ON  6/28/17 AT 1937 BY MO      RBC Count  3.8 - 5.2 10e12/L     Quantity not sufficient  NOTIFIED STU NAZARIO RN ON  6/28/17 AT 1937 BY MO      Hemoglobin  11.7 - 15.7 g/dL     Quantity not sufficient  NOTIFIED STU NAZARIO RN ON  6/28/17 AT 1937 BY MO      Hematocrit  35.0 - 47.0 %     Quantity not sufficient  NOTIFIED STU NAZARIO RN ON  6/28/17 AT 1937 BY MO      MCV  78 - 100 fl     Quantity not sufficient  NOTIFIED STU NAZARIO RN ON  6/28/17 AT 1937 BY MO      MCH  26.5 - 33.0 pg     Quantity not sufficient  NOTIFIED STU NAZARIO RN ON  6/28/17 AT 1937 BY MO      MCHC  31.5 - 36.5 g/dL     Quantity not sufficient  NOTIFIED STU NAZARIO RN ON  6/28/17  BY MO      RDW  10.0 - 15.0 %     Quantity not sufficient  NOTIFIED STU NAZARIO RN ON  6/28/17 AT 1937 BY MO      Platelet Count  150 - 450 10e9/L     Quantity not sufficient  NOTIFIED STU NAZARIO RN ON  6/28/17 AT 1937 BY MO     Magnesium   Result Value Ref Range    Magnesium 2.8 (H) 1.6 - 2.3 mg/dL   Phosphorus   Result Value Ref Range    Phosphorus 3.8 2.5 - 4.5 mg/dL   CK total   Result Value Ref Range    CK Total 98 30 - 225 U/L   Lactic acid whole blood   Result Value Ref Range    Lactic Acid 1.3 0.7 - 2.1 mmol/L   CBC with platelets   Result Value Ref Range    WBC 45.7 (H) 4.0 - 11.0 10e9/L    RBC Count 3.62 (L) 3.8 - 5.2 10e12/L    Hemoglobin 7.9 (L) 11.7 - 15.7 g/dL    Hematocrit 28.1 (L) 35.0 - 47.0 %    MCV 78 78 - 100 fl    MCH 21.8 (L) 26.5 - 33.0 pg    MCHC 28.1 (L) 31.5 - 36.5 g/dL    RDW 18.4 (H) 10.0  - 15.0 %    Platelet Count 213 150 - 450 10e9/L   CBC with platelets differential   Result Value Ref Range    WBC 48.6 (H) 4.0 - 11.0 10e9/L    RBC Count 3.21 (L) 3.8 - 5.2 10e12/L    Hemoglobin 6.9 (LL) 11.7 - 15.7 g/dL    Hematocrit 24.6 (L) 35.0 - 47.0 %    MCV 77 (L) 78 - 100 fl    MCH 21.5 (L) 26.5 - 33.0 pg    MCHC 28.0 (L) 31.5 - 36.5 g/dL    RDW 18.3 (H) 10.0 - 15.0 %    Platelet Count 500 (H) 150 - 450 10e9/L    Diff Method Automated Method     % Neutrophils 89.7 %    % Lymphocytes 5.7 %    % Monocytes 3.5 %    % Eosinophils 0.1 %    % Basophils 0.1 %    % Immature Granulocytes 0.9 %    Nucleated RBCs 0 0 /100    Absolute Neutrophil 43.6 (H) 1.6 - 8.3 10e9/L    Absolute Lymphocytes 2.8 0.8 - 5.3 10e9/L    Absolute Monocytes 1.7 (H) 0.0 - 1.3 10e9/L    Absolute Eosinophils 0.0 0.0 - 0.7 10e9/L    Absolute Basophils 0.1 0.0 - 0.2 10e9/L    Abs Immature Granulocytes 0.5 (H) 0 - 0.4 10e9/L    Absolute Nucleated RBC 0.0    Basic metabolic panel   Result Value Ref Range    Sodium 138 133 - 144 mmol/L    Potassium 3.8 3.4 - 5.3 mmol/L    Chloride 110 (H) 94 - 109 mmol/L    Carbon Dioxide 21 20 - 32 mmol/L    Anion Gap 7 3 - 14 mmol/L    Glucose 101 (H) 70 - 99 mg/dL    Urea Nitrogen 8 7 - 30 mg/dL    Creatinine 0.78 0.52 - 1.04 mg/dL    GFR Estimate >90  Non  GFR Calc   >60 mL/min/1.7m2    GFR Estimate If Black >90   GFR Calc   >60 mL/min/1.7m2    Calcium 7.5 (L) 8.5 - 10.1 mg/dL   Magnesium   Result Value Ref Range    Magnesium 2.6 (H) 1.6 - 2.3 mg/dL   Hemoglobin   Result Value Ref Range    Hemoglobin 7.7 (L) 11.7 - 15.7 g/dL   Type and Screen (AM Draw)   Result Value Ref Range    ABO A     RH(D)  Pos     Antibody Screen Neg     Test Valid Only At       [facility]    Specimen Expires 07/02/2017    Glucose by meter   Result Value Ref Range    Glucose 80 70 - 99 mg/dL   XR Chest Port 1 View    Narrative    XR CHEST PORT 1 VW  6/29/2017  9:11 AM      HISTORY: evaluate for pneumonia    COMPARISON: None    FINDINGS: Single portable AP view of the chest prone.  Ventriculoperitoneal shunt in place. Severely limited examination.  Very low lung volumes. There is no definite focal airspace  consolidation. There are bilateral perihilar opacities, though this  may be related to the patient's body habitus. Skeletal deformity  consistent with patient's history of spina bifida.      Impression    IMPRESSION: Extremely low lung volumes in this patient with spina  bifida. Perihilar opacities are nonspecific and may be due to  atelectasis. Otherwise no definite focal consolidation on this  extremely limited examination..    I have personally reviewed the examination and initial interpretation  and I agree with the findings.    NINA LINO MD

## 2017-06-29 NOTE — PLAN OF CARE
Pharmacy Renal Dosing Adjustment    Patient currently receiving piperacillin/tazo 3.375g IV Q6h.  Patient's Scr has changed from 0.48-0.78 in past 24 hours, patient's weight also only 38.9kg.  The following medications listed below are more appropriately dosed at piperacillin/tazo 2.25g IV Q6h dose will be automatically changed per [facility] renal dosing policy.     Loree Gandhi, PharmD  [phone]

## 2017-06-29 NOTE — PROGRESS NOTES
Critical Care Services Progress Note:  I personally examined and evaluated the patient today. I formulated today s plan with the house staff team or resident(s) and agree with the findings and plan in the associated note (see separately attested resident note).   I have evaluated all laboratory values and imaging studies from the past 24 hours.  Summary of hospital course:  26F with pmh of spina bifida and scoliosis, s/p  shunt, presented to OSH with sepsis requiring small dose levophed, likely urinary source.  Overnight events/pertinent findings today:  Clinically improved.  Off pressors.  Bp/hr acceptable off pressors.  Satting well on RA to  nasal cannula.   Comfortable on exam.  Very pleasant.  Labs (personally reviewed):  Lytes stable/acceptable.  Mild creatinine bump .47 to 0.7 .  Lactate 1.3.  Wbc still high 48 (stable).  hgb stable 7.7.  pltls ok 500.  Urinalysis at osh c/w uti.  cx pending.  Blood cultures from osh pending.  Assessment/plan:  1.  Shock:  Resolved.  Now off pressors. Lactate ok.  2.  UTI:  cx pending. Treating with zosyn.  3.   KIMBERLY:  In setting of sepsis.  Monitor uop/cr.  4.  Headache:  Likely due to infection.  Shunt series and head CT ok. Appreciate NSG input.  Low suspicion for meningitis.  May leave ICU today.  Rest per resident note.   I spent a total of 35 minutes (excluding procedure time) personally providing care for this patient.       Joseph Tapia

## 2017-06-29 NOTE — PROGRESS NOTES
Transferred from . Settled patient at 1500. Bladder scanned for 53cc. No pain. BA on. Call light within reach.

## 2017-06-29 NOTE — PROGRESS NOTES
[facility], [place]  Internal Medicine ICU Transfer Note      Assessment & Plan   Shelton Acosta is a 26 year old female admitted on 6/28/2017. She has a history of spina bifida s/p  shunt, severe kyphosis and scoliosis, developmental delay, neurogenic bladder, and iron deficiency anemia who was admitted for septic shock 2/2 possible UTI. Patient was transferred to the MICU from [facility] ED, then transferred to the Medicine unit as patient was hemodynamically stable.    Septic Shock likely 2/2 UTI. Shock has resolved. Admitted with hypotension and tachycardia requiring pressors at OSH ED. Did not require pressors at [facility] MICU. WBC 48, Procal 30.6, lactate 1.3 (3.7). UA at OSH concerning for UTI. Blood cultures drawn at OSH and started on Zosyn for suspected UTI (s/p Vanc, Amp, Zosyn-present). Called [facility] 6/29 for update on blood and urine cultures; however, cultures are pending. CT Head without hydrocephalus. Neurosurgery did not feel  shunt tap was necessary and unlikely to be meningitic. Currently on 1L O2 and desatted to 90% when removed per MICU staff, likely 2/2 body habitus. Hemodynamically stable, afebrile.  - Continue Zosyn for suspected UTI  - Follow urine and blood cultures at [facility]    - Trend CBC, procal    Spina Bifida s/p  shunt. Per notes, pt has no sensation below the waist and always lays on her stomach. Neurosurgery consulted and did not feel shunt tap was necessary, but will consider if clinically worsens. Neurosurgery following.   - Oxygen available PRN  - Consider PT/OT consult in AM    Iron Deficiency Anemia, Alpha thalassemia trait. Hgb baseline unclear, all readings from HP are ~7-8. Followed by Dr. Benson at [facility] Heme/Onc. Low ferritin, low iron, low transferrin and high TIBC per last Heme/Onc note in 2015. Has required IV iron most recently 4/15/16.   - Continue PTA iron supplement.  - Trend CBC daily  - Transfuse for Hgb <7  - Recommend follow  up with Dr. Benson on d/c     KIMBERLY. Creatinine from 0.47 to 0.78 overnight. In setting of sepsis and likely UTI. No signs of obstruction (straight caths), no hypovolemia on exam.  - Trend BMP    Neurogenic Bladder. Straight caths at home.   - Bladder scan and straight cath if >300ml    Diet: Advance Diet as Tolerated: Regular Diet Adult; 2 gm NA Diet; Low Fat Diet; Other - please comment (No dairy or eggs)  Room Service  Fluids: PO fluids  DVT Prophylaxis: Enoxaparin (Lovenox) SQ  Code Status: DNR/DNI    Disposition Plan   Expected discharge: 2 - 3 days; recommended to prior living arrangement once patient is transitioned from IV abx and medically stable.     Entered: Ruben Cuenca 06/29/2017, 4:45 PM   Information in the above section will display in the discharge planner report.      The patient's care was discussed with the Attending Physician, Dr. Vyas, Bedside Nurse and Patient.    Ruben Cuenca  Internal Medicine Staff Hospitalist Service  [facility]  Pager: [phone]  Please see sticky note for cross cover information    Interval History   Patient is feeling well. She endorses a headache. She has no pain otherwise. States she straight caths at home. Does not have chest pain or shortness of breath.    Data reviewed today: I reviewed all medications, new labs and imaging results over the last 24 hours. I personally reviewed the chest x-ray image(s) showing low lung volumes without concerns for pneumonia..    Physical Exam   Vital Signs: Temp: 97.4  F (36.3  C) Temp src: Oral BP: 122/48   Heart Rate: 89 Resp: 18 SpO2: 93 % O2 Device: Nasal cannula Oxygen Delivery: 1 LPM  Weight: 85 lbs 12.14 oz  General Appearance: Lying on stomach. In NAD.   Respiratory: Lungs CTAB posteriorly. Effort normal on 1LPM NC.   Cardiovascular: Not assessed due to inability to lay on back.   GI: Not assessed due to inability to lay on back.   Back: Significant kyphosis and scoliosis.  Skin: Erythematous  lesions scattered throughout dorsal hands and forearms.   Other: Atrophy in BLE. Does not move BLE.     Data     Recent Labs  Lab 06/29/17  0457 06/29/17  0337 06/28/17 1959 06/28/17  1911   WBC  --  48.6* 45.7* Quantity not sufficientNOTIFIED STU NAZARIO RN ON  6/28/17 AT 1937 BY MO   HGB 7.7* 6.9* 7.9* Quantity not sufficientNOTIFIED STU NAZARIO RN ON  6/28/17 AT 1937 BY MO   MCV  --  77* 78 Quantity not sufficientNOTIFIED STU NAZARIO RN ON  6/28/17 AT 1937 BY MO   PLT  --  500* 213 Quantity not sufficientNOTIFIED STU NAZARIO RN ON  6/28/17 AT 1937 BY MO   NA  --  138  --  136   POTASSIUM  --  3.8  --  3.2*   CHLORIDE  --  110*  --  108   CO2  --  21  --  19*   BUN  --  8  --  7   CR  --  0.78  --  0.47*   ANIONGAP  --  7  --  9   CAM  --  7.5*  --  7.3*   GLC  --  101*  --  90     Recent Results (from the past 24 hour(s))   XR Chest Port 1 View    Narrative    XR CHEST PORT 1 VW  6/29/2017 9:11 AM      HISTORY: evaluate for pneumonia    COMPARISON: None    FINDINGS: Single portable AP view of the chest prone.  Ventriculoperitoneal shunt in place. Severely limited examination.  Very low lung volumes. There is no definite focal airspace  consolidation. There are bilateral perihilar opacities, though this  may be related to the patient's body habitus. Skeletal deformity  consistent with patient's history of spina bifida.      Impression    IMPRESSION: Extremely low lung volumes in this patient with spina  bifida. Perihilar opacities are nonspecific and may be due to  atelectasis. Otherwise no definite focal consolidation on this  extremely limited examination..    I have personally reviewed the examination and initial interpretation  and I agree with the findings.    NINA LINO MD

## 2017-06-30 NOTE — PLAN OF CARE
Problem: Goal Outcome Summary  Goal: Goal Outcome Summary  Pt desatting to 50% when not wearing 1-2L O2; all O2 probes changed and same result; HR up to 140's; BP 73/37; pt diaphoretic and pale; c/o H/A; pt still responding to questions; Rapid Response called;  came to bedside to assess: ordered IV bolus; transfer pt to ; EKG; U/S. Has not made any urine today per report and charting. Continue to monitor.

## 2017-06-30 NOTE — PHARMACY-VANCOMYCIN DOSING SERVICE
Pharmacy Vancomycin Initial Note  Date of Service 2017  Patient's  1990  Ms. Shelton Acosta is a 26 year old woman admitted on 17 transferred from [facility] with possible sepsis 2/2 pyelonephritis.  Urine culture from there is reported to be 50-100K gm negative rods.  She has a hx of spina bifida with a  shunt.  She has a history of UTI's with the most recent organisms Morganella and E. Coli.  Note that Procalcitonin here is 38.09.    Wt = 41.4 kg    Indication: Pyelonephritis    Current estimated CrCl = 43 ml/min based on her actual wt of 41.4 kg.  Calculated GFR is 41 ml/min. Note that her creat has tripled over the past 48 hours and she has very low UOP.    Creatinine for last 3 days  2017:  7:11 PM Creatinine 0.47 mg/dL  2017:  3:37 AM Creatinine 0.78 mg/dL  2017: 12:31 AM Creatinine 1.52 mg/dL    Recent Vancomycin Level(s) for last 3 days  No results found for requested labs within last 72 hours.      Vancomycin IV Administrations (past 72 hours)      No vancomycin orders with administrations in past 72 hours.                Nephrotoxins and other renal medications (Future)    Start     Dose/Rate Route Frequency Ordered Stop    17 0800  vancomycin (VANCOCIN) 1000 mg in dextrose 5% 200 mL PREMIX      1,000 mg Intravenous ONCE 17 0749      17 1400  piperacillin-tazobactam (ZOSYN) 2.25 g vial to attach to  ml bag      2.25 g  over 30 Minutes Intravenous EVERY 6 HOURS 17 0828            Contrast Orders - past 72 hours     None                Plan:  1.  Start vancomycin 1000 mg x 1 dose (24 mg/kg). Will re-evaluate dosing in the next 24 hours.  2.  Goal Trough Level: 15-20 mg/L   3.  Pharmacy will check trough levels as appropriate in 1-3 Days.    4. Serum creatinine levels will be ordered daily for the first week of therapy and at least twice weekly for subsequent weeks.    5. [place] method utilized to dose vancomycin therapy: Method 2    6. Follow-up on culture data from [facility].    Fransisco Henderson, PharmD. [facility] pager [phone].

## 2017-06-30 NOTE — PLAN OF CARE
Problem: Goal Outcome Summary  Goal: Goal Outcome Summary  Pt family notified by this RN; spoke with sister to inform the family of the move back to .

## 2017-06-30 NOTE — PROGRESS NOTES
SPIRITUAL HEALTH SERVICES  SPIRITUAL ASSESSMENT Progress Note  [facility] ([place]) 4C     Checked in with pt's family at bedside after pt's death. Mother requested prayer. Other family are on their way in.    PLAN: additional  support available if needed.    Td Hollins M.Div. (Bill), [facility]  Staff   Pager [phone]

## 2017-06-30 NOTE — DISCHARGE SUMMARY
[facility], [place]    Death Summary  Intensive Care    Date of Admission:  6/28/2017  Date of Death:         6/30/2017  Provider Completing Death Summary: Sujata Giles    Discharge Diagnoses   Active Problems:    Hypotension  Septic Shock  Urinary tract infection  Spina Bifida    History of Present Illness   Shelton Acosta is an 26 year old  female with a significant past medical history of spina bifida s/p  shunt, who presents from OSH with a CC of HA found to have hypotension requiring norepi support. She was transferred here for further management of hypotension and eval of  shunt by neurosurgery.    Hospital Course   Shelton Acosta was admitted on 6/28/2017.  The following problems were addressed during her hospitalization:    Pt was admitted with hypotension and a new HA from an OSH on 6/28. She was initially given pressors at the OSH, but did not initially require any upon arrival here. She was started on zosyn, cultures were obtained, but her UA showed significant signs of infection. On 6/29, Pt was alert and interactive, and was deemed stable to transfer out of the ICU. Around midnight of 6/30, Pt's BP decreased significantly again, she received several liters of fluid, and was transferred to step-down level of care, but ended up in the MICU as overflow. The morning of 6/30, Pt's BP continued to be low with MAPs in the 50s, she was started on norepi, and transferred to the MICU service, but her pressures continued to fall and her HR started becoming increasingly bradycardic, so she was started on dopamine as well. Pt's family was contacted regarding the Pt's grave prognosis. They did confirm the Pt remained DNR/DNI, and were on there way to the hospital. Upon arrival, Pt's HR consistently was dropping to the 40s and her MAPs were barely in the high 50's. With an interpretor at the bedside, Pt's mother and sister continued to voice their hope for Pt to recover and wanted to  proceed with PICC placement for ongoing pressors. The family was aware that despite all of our efforts, the Pt would most likely not survive, and there was a significant risk for a fatal arrhythmia, but they were hoping more family would be able to see her. Pt was in the process of being supinated, when she went bradycardic into the teens and then finally asystole with the Pt's mother and sister at bedside. Pt was pronounced at 10:02 AM. A  was available and Pt's extended family came shortly after the Pt was pronounced.    Cause of death: septic shock    Primary Care Physician   No primary care provider on file.    Consultations This Hospital Stay   NEUROSURGERY IP CONSULT  VASCULAR ACCESS ADULT IP CONSULT  VASCULAR ACCESS ADULT IP CONSULT  PHARMACY TO DOSE VANCO    Time Spent on this Encounter   I, Sujata Giles, personally saw the patient today and spent greater than 30 minutes discharging this patient.    Data   Most Recent 3 CBC's:  Recent Labs   Lab Test  06/30/17   0031  06/29/17   0457  06/29/17 0337  06/28/17 1959 06/28/17 1911   WBC  27.8*   --   48.6*  45.7*  Quantity not sufficient  NOTIFIED STU NAZARIO RN ON  6/28/17 AT 1937 BY MO     HGB   --   7.7*  6.9*  7.9*  Quantity not sufficient  NOTIFIED STU NAZARIO RN ON  6/28/17 AT 1937 BY MO     MCV   --    --   77*  78  Quantity not sufficient  NOTIFIED STU NAZARIO RN ON  6/28/17  BY MO     PLT   --    --   500*  213  Quantity not sufficient  NOTIFIED STU NAZARIO RN ON  6/28/17 AT 1937 BY MO        Most Recent 3 BMP's:  Recent Labs   Lab Test  06/30/17   0031  06/29/17   0337  06/28/17 1911   NA  141  138  136   POTASSIUM  3.8  3.8  3.2*   CHLORIDE  111*  110*  108   CO2  22  21  19*   BUN  13  8  7   CR  1.52*  0.78  0.47*   ANIONGAP  8  7  9   CAM  7.7*  7.5*  7.3*   GLC  107*  101*  90     Most Recent 2 LFT's:No lab results found.  Most Recent INR's and Anticoagulation Dosing  History:  Anticoagulation Dose History     There is no flowsheet data to display.        Most Recent 3 Troponin's:No lab results found.  Most Recent Cholesterol Panel:No lab results found.  Most Recent 6 Bacteria Isolates From Any Culture (See EPIC Reports for Culture Details):No lab results found.  Most Recent TSH, T4 and A1c Labs:No lab results found.     Pt seen and discussed with Dr. Tapia, who agrees with the assessment and plan.    Sujata Giles MD  PGY-2, Internal Medicine  [phone]

## 2017-06-30 NOTE — PROVIDER NOTIFICATION
Gold Team Cross Cover paged regarding sustained MAPs 50s, decreased LOC and UA results. MD assessed at bedside and ordered mIVF of NS at 200. Per MD: am team will see her first.    Lizabeth Keita RN, BSN, CCRN

## 2017-06-30 NOTE — PROGRESS NOTES
Neurosurgery Addendum     No neurosurgical intervention indicated at this time in setting of proven urosepsis +UTI. Please contact if further declines despite treatment for UTI. Will otherwise sign off.     Emelia Nguyen MD  Neurosurgery

## 2017-06-30 NOTE — PROVIDER NOTIFICATION
06/29/17 2300   Call Information   Date of Call 06/29/17   Time of Call 2330   Name of person requesting the team Zenon   Title of person requesting team RN   RRT Arrival time 2333   Time RRT ended 0125   Reason for call   Type of RRT Adult   Primary reason for call Fluid status;Staff concerned   Fluid status Urine output less than 50 mL/hrs   Was patient transferred from the ED, ICU, or PACU within last 24 hours prior to RRT call? No   SBAR   Situation Pt came up from 4C around 1500. Has not had any urine, per oxygen monitor heart rate all over 108-141   Background Spina bifada with a shunt   Assessment Lungs clear, nods yes/no to questions, heart rate tachy   Interventions Blood glucose;ECG;Fluid bolus;Labs   Patient Outcome   Patient Outcome Transferred to  (4C as 6B overflow)   RRT Team   Physician(s) Dr Hester   Lead RN Libertad Yarbrough   RT Jordyn   Post RRT Intervention Assessment   Comments On the ICU

## 2017-06-30 NOTE — PROVIDER NOTIFICATION
Gold Team Cross Cover paged stating: HR 90-120s, BP 93/45 (MAP 67), procalcitonin 38.9, Creatinine over doubled, awaiting delivery of pediatric ivey cath, 1L bolus complete. MD arrived at bedside, assessed lungs with ultrasound and 1L bolus ordered to run at 250cc/hr.     Lizabeth Keita, RN, BSN, CCRN

## 2017-06-30 NOTE — PROGRESS NOTES
"Brief XC Note (midnight)    S: Called that patient was desaturating down to 50s when 1L removed, appeared real; quite ashen, and no UOP throughout whole day.  RR called, and I was notified.  Hx reviewed, notably this is a 25 yo with spina bifida, urinary retention (striaght cath bid), DD (appx age 9 level), shunt; tx from OSH due to suspected septic shock.   Urine suspicious to team.  NS saw pt, did not wish to test spinal fluid.  Head CT there unremarkable.    O:  Vitals reviewed.  HR in 140 range.  BPs low (see chart).   40kg wt.  On 1L.  Desats to mid-80s wihen 02 removed.   Neuro: pupils symmetric  Gen: face down (prefers prone position), interactive, nods heads to questions.  CV: TAchy, reg rhythym  Abd: soft, NT, ND  Pulm: asymetric air fields, reduced at right base.   No crackles/ wheezes  PV: Slight coldness of limbs    POCUS: completely collapsed IVC throughotu resp cycle.  Hyperdynamic heart.      WBC 48.  HCO3: 19.     Urine from OSH reviewed  CXR from this AM reviewed;    A/P:  26 yof with spina bifida, neurogenic bladder, shunt, severe leukocytosis, pyuria, with likely persistent septic shock.   On Zosyn  - place ivey until consistent UOP  - 1L of fluid over 2 hours (monitor closely)  - tx to 6B  - labs now  - no change in abx for now.  - re-eval in 2-3 hours or earlier as needed    Gildardo Hester MD  [facility]    -----------------------------    2 am Addendum  [facility] called ([phone])-- urine positive for 50-100k GNR.  Susceptibilities pending    [facility] Labs notable for Cr 1.5, WBC: 28, Very high procal    Txed to 6b status (in ICU due to beds).  HR down to 120.  BP improving.   ICU nursing team notified family   --------------------------------------------    645 am:    No fevers overnight  UOP remains very low (ivey placed), 15 cc of \"milky\" urine obtained.  2 L fluid in since midnight  On 3-4L 02, increase overnight. RR as per epic    Sleeping, appears comfortable " resting on abdomen, quite difficult to rouse  Lungs coarse, no crackles.  Cap refill 1-2 seconds, hands cold  Labs reviewed, UA with large clumps WBCS    Plan to continue @ 200 cc/ hr  Renal Ultrasound  Discussed with MICU team fellow Dr Denise, whom accepted pt as tx back to MICU team.  They are planning to attempt central access, give 500 cc bolus  Could consider stress dose.    Consider Kiko if there is concern about Zosyn susceptibilities  MICU team plans to notify family of deterioration (family was notified that status worsening and back in ICU at about 2 am)    Gildardo Hester MD  Med-Peds Hospitalist

## 2017-06-30 NOTE — PROGRESS NOTES
[facility] ICU Progress Note          Assessment and Plan:   Assessment:   This patient is a 26 year old  female with a significant past medical history of spina bifida s/p  shunt, who presents from OSH with a CC of HA found to have hypotension requiring norepi support. She was transferred here for further management of hypotension and eval of  shunt by neurosurgery. Now off pressors.       Plan:   Neuro   #Spina bifida: Per mom, patient is at baseline neurologically. She is interactive but per mom has trouble with communication; she is effectively quadriplegic and has no sensation or movement below her waist    #Pain: acetaminophen 650 Q4H PRN     CVD:   #Hypotension: in the setting of urosepsis, meningitis less likely   - Improved with fluids and pressors  - Pt now with MAPs in the 70s and off pressors     Pulm: Patient is not on oxygen at home. She is currently on NC O2 at 1L and desatted to lower 90's when we removed NC. Low suspicion for PNA (no cough, fever), most likely 2/2 body habitus.  - CXR    ID:  # Septic shock: source likely UTI, vs lower concern for meningitis. UA at OSH and elevated WBC concerning for UTI. OSH concerned for meningitis given  shunt and hx of HA, and she was empirically treated with vanc and ampicillin.   - Repeated UA/cx  - will f/u blood cxs from OSH  - Zosyn 3.325mg Q6H  - Neurosurgery consulted for eval of - thought empiric treatment for meningitis not necessary     #lactic acidosis: 3.7 at OSH; 1.3 here after fluids     Vanc 6/28  Amp 6/28  Zosyn 6/28- present    Renal: no acute issues; patient is straight cathed at home    Heme  - Microcytic anemia: Hgb at OSH was 8.1; no signs of active bleeding; patient takes Fe at home       LINES: Peripheral line   Prophylaxis:  DVT: Padua prediction score of 4 2/2 immobility and active infection: Lovenox 30mg Q24hr  GI: none   Family:  Updated today with Cambodian    Code Status: DNR/DNI               Interval  History:   Transferred to the floor yesterday- decompensated overnight. Sats in 50's without 1L O2, not mentating; 2L fluid; maps 50-60    -broadene  -PICC ordered  -Repeat blood cultures   - 500cc bolus   - call neurosurgery to see          Significant Problems:     Patient Active Problem List   Diagnosis     Hypotension      Gen: Pt interviewed with Cambodian  in the room; pt lying on stomach, NAD  Pulm: LCTA posteriorly, no crackles or wheezes   Cardiac: hard to appreciate heart sounds as patient is unable to lay on her back; tachycardic  Abd: soft, non tender   Neuro: tracks with eyes, alert   Extremities: atrophy; warm; non-edematous   Back: marked kyphosis with surgical scars, scoliosis   Skin: scars on knuckles and arms        Review of Systems:   The Review of Systems is negative other than noted in the HPI          Medications:     Current Facility-Administered Medications   Medication     NaCl 0.9 % infusion     0.9% sodium chloride infusion     lidocaine 1 % 0.5-5 mL     lidocaine (LMX4) kit     sodium chloride (PF) 0.9% PF flush 5-50 mL     heparin lock flush 10 UNIT/ML injection 2-5 mL     lactated ringers BOLUS 500 mL     piperacillin-tazobactam (ZOSYN) 2.25 g vial to attach to  ml bag     enoxaparin (LOVENOX) PEDS/NICU injection 20 mg     naloxone (NARCAN) injection 0.1-0.4 mg     senna-docusate (SENOKOT-S;PERICOLACE) 8.6-50 MG per tablet 1-2 tablet     ondansetron (ZOFRAN-ODT) ODT tab 4 mg    Or     ondansetron (ZOFRAN) injection 4 mg     acetaminophen (TYLENOL) solution 650 mg     ferrous sulfate 300 (60 FE) MG/5ML syrup 300 mg            Data:     Results for orders placed or performed during the hospital encounter of 06/28/17 (from the past 24 hour(s))   Glucose by meter   Result Value Ref Range    Glucose 80 70 - 99 mg/dL   XR Chest Port 1 View    Narrative    XR CHEST PORT 1 VW  6/29/2017 9:11 AM      HISTORY: evaluate for pneumonia    COMPARISON: None    FINDINGS: Single  portable AP view of the chest prone.  Ventriculoperitoneal shunt in place. Severely limited examination.  Very low lung volumes. There is no definite focal airspace  consolidation. There are bilateral perihilar opacities, though this  may be related to the patient's body habitus. Skeletal deformity  consistent with patient's history of spina bifida.      Impression    IMPRESSION: Extremely low lung volumes in this patient with spina  bifida. Perihilar opacities are nonspecific and may be due to  atelectasis. Otherwise no definite focal consolidation on this  extremely limited examination..    I have personally reviewed the examination and initial interpretation  and I agree with the findings.    NINA LINO MD   Glucose by meter   Result Value Ref Range    Glucose 118 (H) 70 - 99 mg/dL   EKG 12-lead, complete   Result Value Ref Range    Interpretation ECG Click View Image link to view waveform and result    Lactic acid level STAT   Result Value Ref Range    Lactic Acid 2.1 0.7 - 2.1 mmol/L   WBC count   Result Value Ref Range    WBC 27.8 (H) 4.0 - 11.0 10e9/L   Basic metabolic panel   Result Value Ref Range    Sodium 141 133 - 144 mmol/L    Potassium 3.8 3.4 - 5.3 mmol/L    Chloride 111 (H) 94 - 109 mmol/L    Carbon Dioxide 22 20 - 32 mmol/L    Anion Gap 8 3 - 14 mmol/L    Glucose 107 (H) 70 - 99 mg/dL    Urea Nitrogen 13 7 - 30 mg/dL    Creatinine 1.52 (H) 0.52 - 1.04 mg/dL    GFR Estimate 41 (L) >60 mL/min/1.7m2    GFR Estimate If Black 50 (L) >60 mL/min/1.7m2    Calcium 7.7 (L) 8.5 - 10.1 mg/dL   Procalcitonin   Result Value Ref Range    Procalcitonin 38.92 (HH) ng/ml   UA with Microscopic reflex to Culture   Result Value Ref Range    Color Urine       Quantity not sufficient  NOTIFIED JOCY GORE MARY LOU U4C ON 06/30/17 @ 0500 BY DT      Appearance Urine       Quantity not sufficient  NOTIFIED JOCY GORE MARY LOU U4C ON 06/30/17 @ 0500 BY DT      Glucose Urine (A) NEG mg/dL     Quantity not sufficient  NOTIFIED  RN BAO MARY LOU U4C ON 06/30/17 @ 0500 BY DT      Bilirubin Urine (A) NEG     Quantity not sufficient  NOTIFIED RN BAO MARY LOU U4C ON 06/30/17 @ 0500 BY DT      Ketones Urine (A) NEG mg/dL     Quantity not sufficient  NOTIFIED RN BAO MARY LOU U4C ON 06/30/17 @ 0500 BY DT      Specific Gravity Urine  1.003 - 1.035     Quantity not sufficient  NOTIFIED RN BAO MARY LOU U4C ON 06/30/17 @ 0500 BY DT      Blood Urine (A) NEG     Quantity not sufficient  NOTIFIED RN BAO MARY LOU U4C ON 06/30/17 @ 0500 BY DT      pH Urine  5.0 - 7.0 pH     Quantity not sufficient  NOTIFIED RN BAO MARY LOU U4C ON 06/30/17 @ 0500 BY DT      Protein Albumin Urine (A) NEG mg/dL     Quantity not sufficient  NOTIFIED RN BAO MARY LOU U4C ON 06/30/17 @ 0500 BY DT      Urobilinogen mg/dL  0.0 - 2.0 mg/dL     Quantity not sufficient  NOTIFIED RN BAO MARY LOU U4C ON 06/30/17 @ 0500 BY DT      Nitrite Urine (A) NEG     Quantity not sufficient  NOTIFIED RN BAO MARY LOU U4C ON 06/30/17 @ 0500 BY DT      Leukocyte Esterase Urine (A) NEG     Quantity not sufficient  NOTIFIED RN BAO MARY LOU U4C ON 06/30/17 @ 0500 BY DT      Source       Quantity not sufficient  NOTIFIED RN BAO MARY LOU U4C ON 06/30/17 @ 0500 BY DT  CORRECTED ON 06/30 AT 0501: PREVIOUSLY REPORTED AS Unspecified Urine      WBC Urine  0 - 2 /HPF     Quantity not sufficient  NOTIFIED RN BAO MARY LOU U4C ON 06/30/17 @ 0500 BY DT      RBC Urine  0 - 2 /HPF     Quantity not sufficient  NOTIFIED RN BAO MARY LOU U4C ON 06/30/17 @ 0500 BY DT     UA with Microscopic reflex to Culture   Result Value Ref Range    Color Urine Light Brown     Appearance Urine Cloudy     Glucose Urine 30 (A) NEG mg/dL    Bilirubin Urine Negative NEG    Ketones Urine 5 (A) NEG mg/dL    Specific Gravity Urine 1.030 1.003 - 1.035    Blood Urine Moderate (A) NEG    pH Urine 6.0 5.0 - 7.0 pH    Protein Albumin Urine >600 (A) NEG mg/dL    Urobilinogen mg/dL Normal 0.0 - 2.0 mg/dL     Nitrite Urine Negative NEG    Leukocyte Esterase Urine Large (A) NEG    Source Catheterized Urine     WBC Urine 737 (H) 0 - 2 /HPF    RBC Urine 27 (H) 0 - 2 /HPF    WBC Clumps Present (A) NEG /HPF

## 2017-06-30 NOTE — PLAN OF CARE
Problem: Goal Outcome Summary  Goal: Goal Outcome Summary  Outcome: Declining  Pt obtunded upon arrival with barely arousable to touch. O2 sats varying from from 70-90s with turning. Misael/junctional with turning down to the 40sMAPS in 40s-60Levo started @ 0.15  Dopamine started and @ 2 up to 5 to 7.5.   Facemask @ 15LPM. Coarse LS. Pt on NC @ 100% sating in mid 90s. Switched to HFNC.Cap refill sluggish. Pt cold  Mottling. Arrival at 0930. Family wanted a PICC as pressors through PIV. Pt on stomach for comfort but moved to back w/+4 assisst. Pt then proceeded to desat into the 60-80s on turning and HR into the 20-40s. Family back in room for time of death at 1002.      Pt washed. Lines removed per family request.Family brought clothing and transferring to  home.

## 2017-06-30 NOTE — PLAN OF CARE
Problem: Goal Outcome Summary  Goal: Goal Outcome Summary  D: 27 y/o F with spina bifida, neurogenic bladder, shunt, severe leukocytosis, pyuria, with likely persistent septic shock. On Zosyn. Transfer from  s/p RRT for tachycardia, hypotension, acute desaturation and minimal UOP  A/I: HR vacillating between 80s-120s. Was maintaining MAPs >65 with the help of an additional 1L bolus (after MD was called to bedside). However, around 06:00, MAPs fell to <55 and patient ceased to have meaningful response. MD again assessed at bedside and ordered NS to run at 200cc/hr. Difficult to obtain accurate SpO2 on fingers or ears but good pleth on R heel with sock covering. Required increase O2 to 4L NC. 10F Pediatric ivey catheter placed with minimal output--MD aware and UA/UC sent. Rectal pouch placed for slow, continuous flow of soft stool.    P: Would recommend transfer to MICU Service given declining status. Likely needs central access--consider PICC. Judith-area in need of WOC consult. Consider sending stool sample for Cdiff. Assess for specialty bed

## 2017-06-30 NOTE — PROGRESS NOTES
26F presented yesterday for presumed urosepsis. Pressor needs and clinical condition improved and she was transferred to the floor.  Apparently around midnight last night she had an acute change in mental status--markedly more somnolent, with a MAP decrease to mid 50s.  This was monitored overnight by the med team, and as there was no improvement this morning, she was transferred back to the ICU team.  On transfer she remained essentially obtunded, hypotensive and bradycardic.  Dopamine and norepi were emergently started peripeheraly with good effect.  We had suspicion for infection with overwhelming sepsis vs CNS event (shunt obstruction, etc). Plans were made for CT of the head chest abd/pelv, however after contacting the family they expressed that given her poor baseline quality of life they would favor comfort measures only.  Comfort measures were implemented and she passed shortly thereafter.

## 2017-06-30 NOTE — PLAN OF CARE
Problem: Goal Outcome Summary  Goal: Goal Outcome Summary  Report given to Lizabeth on 4C. Transferred in her bed with float float RNs.

## 2017-06-30 NOTE — SIGNIFICANT EVENT
MD DEATH PRONOUNCEMENT    Called to pronounce Shelton marrufo.    Physical Exam: Unresponsive to noxious stimuli, Spontaneous respirations absent, Breath sounds absent, Carotid pulse absent, Heart sounds absent, Pupillary light reflex absent and Corneal blink reflex absent    Patient was pronounced dead at 10:02 AM, 2017.    Active Problems:    Hypotension  Septic Shock  Spina bifida    Please consider an autopsy if any of the following exist:  NO Unexpected or unexplained death during or following any dental, medical, or surgical diagnostic treatment procedures.   NO Death of mother at or up to seven days after delivery.     NO All  and pediatric deaths.     NO Death where the cause is sufficiently obscure to delay completion of the death certificate.   NO Deaths in which autopsy would confirm a suspected illness/condition that would affect surviving family members or recipients of transplanted organs.     The following deaths must be reported to the 's Office:  NO A death that may be due entirely or in part to any factors other than natural disease (recent surgery, recent trauma, suspected abuse/neglect).   NO A death that may be an accident, suicide, or homicide.     NO Any sudden, unexpected death in which there is no prior history of significant heart disease or any other condition associated with sudden death.   NO Any death which is apparently due to natural causes but in which the  does not have a personal physician familiar with the patient s medical history, social, or environmental situation or the circumstances of the terminal event.   NO Any death apparently due to Sudden Infant Death Syndrome.     NO Deaths that occur during, in association with, or as consequences of a diagnostic, therapeutic, or anesthetic procedure.   NO Any death in which a fracture of a major bone has occurred within the past (6) six months.   NO Any death in which the  was an inmate of a  public institution or was in the custody of Law Enforcement personnel.     Body disposition: Autopsy was discussed with family member:  Mother in person.  Permission for autopsy was declined.    Sujata Giles MD   PGY-2, Internal Medicine  [phone]

## 2017-06-30 NOTE — PLAN OF CARE
Problem: Goal Outcome Summary  Goal: Goal Outcome Summary  Outcome: No Change  Pt is A&O, but unable to communicate much beyond simple yes or no questions. Tachycardic, OVSS on 1L NC. Pt straight-cathed with minimal return of  creamy yellow urine. PIVs SL. IV abx administered per orders. PM lovenox not available as of 2330, so not given this shift.      Continue to monitor and follow the POC.

## 2017-07-01 LAB
BACTERIA SPEC CULT: NORMAL
INTERPRETATION ECG - MUSE: NORMAL
MICRO REPORT STATUS: NORMAL
SPECIMEN SOURCE: NORMAL

## 2017-07-05 LAB — INTERPRETATION ECG - MUSE: NORMAL

## 2022-11-06 NOTE — PROGRESS NOTES
[facility], [place]   Neurosurgery Progress Note:    Assessment: Shelton Acosta is a 26 year old female with spina bifida and a  shunt that appears non-programmmable, who presents with urosepsis. Currently on zosyn empiric antibiotics. Leukocytosis of 45. CT head most recently performed does not appear to demonstrate hydrocephalus.     Plan:  - Neuro checks q 4 hour per routine  - Pain control, minimizing sedating medications as able  - No shunt tap necessary unless acute decline would reconsider as this is unlikely a meningitis process  - In PACs, there is evidence of most recent XR shunt series and CT head. Please also obtain other previous CT head scans to be used for comparison  - IVF have been strongly recommended for patient in setting of extreme sepsis  - Other cares per primary team     Greatly appreciate the help from PT/OT in caring for Shelton Acosta    Emelia Nguyen MD  Neurosurgery, PGY2      Subjective: No acute events overnight.    Objective:   Temp:  [97.9  F (36.6  C)-98.3  F (36.8  C)] 97.9  F (36.6  C)  Heart Rate:  [] 124  Resp:  [9-33] 30  BP: ()/(46-71) 104/68  SpO2:  [89 %-100 %] 93 %  I/O last 3 completed shifts:  In: 370 [P.O.:110; I.V.:260]  Out: -     Gen: Appears comfortable, NAD  Neurologic:  - Awake, alert, oriented x 1 to self.   - Follows simple commands briskly x 4.  - PEREZ purposefully.   - PERRL, EOMI grossly.     LABS  Recent Labs   Lab Test  06/28/17 1959 06/28/17 1911   WBC  45.7*  Quantity not sufficient  NOTIFIED STU NAZARIO RN ON  6/28/17 AT 1937 BY MO     HGB  7.9*  Quantity not sufficient  NOTIFIED STU NAZARIO RN ON  6/28/17 AT 1937 BY MO     MCV  78  Quantity not sufficient  NOTIFIED STU NAZARIO RN ON  6/28/17 AT 1937 BY MO     PLT  213  Quantity not sufficient  NOTIFIED STU NAZARIO RN ON  6/28/17 AT 1937 BY MO         Recent Labs   Lab Test  06/28/17 1911   NA  136   POTASSIUM  3.2*   CHLORIDE  108   CO2   19*   BUN  7   CR  0.47*   ANIONGAP  9   CAM  7.3*   GLC  90        None known